# Patient Record
Sex: FEMALE | Race: WHITE | NOT HISPANIC OR LATINO | URBAN - NONMETROPOLITAN AREA
[De-identification: names, ages, dates, MRNs, and addresses within clinical notes are randomized per-mention and may not be internally consistent; named-entity substitution may affect disease eponyms.]

---

## 2018-06-26 ENCOUNTER — OFFICE VISIT (OUTPATIENT)
Dept: FAMILY MEDICINE CLINIC | Facility: CLINIC | Age: 53
End: 2018-06-26

## 2018-06-26 VITALS
DIASTOLIC BLOOD PRESSURE: 80 MMHG | WEIGHT: 147.9 LBS | SYSTOLIC BLOOD PRESSURE: 122 MMHG | HEIGHT: 64 IN | BODY MASS INDEX: 25.25 KG/M2

## 2018-06-26 DIAGNOSIS — R59.1 LYMPHADENOPATHY: ICD-10-CM

## 2018-06-26 DIAGNOSIS — Z72.0 TOBACCO USE: Primary | ICD-10-CM

## 2018-06-26 PROCEDURE — 99203 OFFICE O/P NEW LOW 30 MIN: CPT | Performed by: FAMILY MEDICINE

## 2018-06-26 PROCEDURE — 99406 BEHAV CHNG SMOKING 3-10 MIN: CPT | Performed by: FAMILY MEDICINE

## 2018-06-26 NOTE — PATIENT INSTRUCTIONS
Steps to Quit Smoking  Smoking tobacco can be harmful to your health and can affect almost every organ in your body. Smoking puts you, and those around you, at risk for developing many serious chronic diseases. Quitting smoking is difficult, but it is one of the best things that you can do for your health. It is never too late to quit.  What are the benefits of quitting smoking?  When you quit smoking, you lower your risk of developing serious diseases and conditions, such as:  · Lung cancer or lung disease, such as COPD.  · Heart disease.  · Stroke.  · Heart attack.  · Infertility.  · Osteoporosis and bone fractures.    Additionally, symptoms such as coughing, wheezing, and shortness of breath may get better when you quit. You may also find that you get sick less often because your body is stronger at fighting off colds and infections. If you are pregnant, quitting smoking can help to reduce your chances of having a baby of low birth weight.  How do I get ready to quit?  When you decide to quit smoking, create a plan to make sure that you are successful. Before you quit:  · Pick a date to quit. Set a date within the next two weeks to give you time to prepare.  · Write down the reasons why you are quitting. Keep this list in places where you will see it often, such as on your bathroom mirror or in your car or wallet.  · Identify the people, places, things, and activities that make you want to smoke (triggers) and avoid them. Make sure to take these actions:  ? Throw away all cigarettes at home, at work, and in your car.  ? Throw away smoking accessories, such as ashtrays and lighters.  ? Clean your car and make sure to empty the ashtray.  ? Clean your home, including curtains and carpets.  · Tell your family, friends, and coworkers that you are quitting. Support from your loved ones can make quitting easier.  · Talk with your health care provider about your options for quitting smoking.  · Find out what  treatment options are covered by your health insurance.    What strategies can I use to quit smoking?  Talk with your healthcare provider about different strategies to quit smoking. Some strategies include:  · Quitting smoking altogether instead of gradually lessening how much you smoke over a period of time. Research shows that quitting “cold turkey” is more successful than gradually quitting.  · Attending in-person counseling to help you build problem-solving skills. You are more likely to have success in quitting if you attend several counseling sessions. Even short sessions of 10 minutes can be effective.  · Finding resources and support systems that can help you to quit smoking and remain smoke-free after you quit. These resources are most helpful when you use them often. They can include:  ? Online chats with a counselor.  ? Telephone quitlines.  ? Printed self-help materials.  ? Support groups or group counseling.  ? Text messaging programs.  ? Mobile phone applications.  · Taking medicines to help you quit smoking. (If you are pregnant or breastfeeding, talk with your health care provider first.) Some medicines contain nicotine and some do not. Both types of medicines help with cravings, but the medicines that include nicotine help to relieve withdrawal symptoms. Your health care provider may recommend:  ? Nicotine patches, gum, or lozenges.  ? Nicotine inhalers or sprays.  ? Non-nicotine medicine that is taken by mouth.    Talk with your health care provider about combining strategies, such as taking medicines while you are also receiving in-person counseling. Using these two strategies together makes you more likely to succeed in quitting than if you used either strategy on its own.  If you are pregnant or breastfeeding, talk with your health care provider about finding counseling or other support strategies to quit smoking. Do not take medicine to help you quit smoking unless told to do so by your health  care provider.  What things can I do to make it easier to quit?  Quitting smoking might feel overwhelming at first, but there is a lot that you can do to make it easier. Take these important actions:  · Reach out to your family and friends and ask that they support and encourage you during this time. Call telephone quitlines, reach out to support groups, or work with a counselor for support.  · Ask people who smoke to avoid smoking around you.  · Avoid places that trigger you to smoke, such as bars, parties, or smoke-break areas at work.  · Spend time around people who do not smoke.  · Lessen stress in your life, because stress can be a smoking trigger for some people. To lessen stress, try:  ? Exercising regularly.  ? Deep-breathing exercises.  ? Yoga.  ? Meditating.  ? Performing a body scan. This involves closing your eyes, scanning your body from head to toe, and noticing which parts of your body are particularly tense. Purposefully relax the muscles in those areas.  · Download or purchase mobile phone or tablet apps (applications) that can help you stick to your quit plan by providing reminders, tips, and encouragement. There are many free apps, such as QuitGuide from the CDC (Centers for Disease Control and Prevention). You can find other support for quitting smoking (smoking cessation) through smokefree.gov and other websites.    How will I feel when I quit smoking?  Within the first 24 hours of quitting smoking, you may start to feel some withdrawal symptoms. These symptoms are usually most noticeable 2-3 days after quitting, but they usually do not last beyond 2-3 weeks. Changes or symptoms that you might experience include:  · Mood swings.  · Restlessness, anxiety, or irritation.  · Difficulty concentrating.  · Dizziness.  · Strong cravings for sugary foods in addition to nicotine.  · Mild weight gain.  · Constipation.  · Nausea.  · Coughing or a sore throat.  · Changes in how your medicines work in your  body.  · A depressed mood.  · Difficulty sleeping (insomnia).    After the first 2-3 weeks of quitting, you may start to notice more positive results, such as:  · Improved sense of smell and taste.  · Decreased coughing and sore throat.  · Slower heart rate.  · Lower blood pressure.  · Clearer skin.  · The ability to breathe more easily.  · Fewer sick days.    Quitting smoking is very challenging for most people. Do not get discouraged if you are not successful the first time. Some people need to make many attempts to quit before they achieve long-term success. Do your best to stick to your quit plan, and talk with your health care provider if you have any questions or concerns.  This information is not intended to replace advice given to you by your health care provider. Make sure you discuss any questions you have with your health care provider.  Document Released: 12/12/2002 Document Revised: 08/15/2017 Document Reviewed: 05/03/2016  [employer] Interactive Patient Education © 2018 [employer]

## 2018-06-26 NOTE — PROGRESS NOTES
Subjective   Norma Dukes is a 52 y.o. female.     History of Present [facility] center follow-up lymphadenopathy.  Develop painful lymph nodes left postauricular area past for 5 days.  No previous problem.  Has chronic bilateral ear pain.  Is a smoker.  Was placed on medication.  Symptoms have slightly abated.  Is noticed no skin lesions or other lesions.  I does visit with gynecology reviewed Cherie.  Is due for colonoscopy.  Past history is significant for cholecystectomy only.  Family history remarkable for father with oral cancer and hypertension.    The following portions of the patient's history were reviewed and updated as appropriate: allergies, current medications, past family history, past medical history, past social history, past surgical history and problem list.    Review of Systems   Constitutional: Negative for activity change, appetite change, fatigue and unexpected weight change.   HENT: Positive for ear pain. Negative for trouble swallowing and voice change.    Eyes: Negative for redness and visual disturbance.   Respiratory: Negative for cough and wheezing.    Cardiovascular: Negative for chest pain and palpitations.   Gastrointestinal: Negative for abdominal pain, constipation, diarrhea, nausea and vomiting.   Genitourinary: Negative for urgency.   Musculoskeletal: Negative for joint swelling.   Neurological: Negative for syncope and headaches.   Hematological: Negative for adenopathy.   Psychiatric/Behavioral: Negative for sleep disturbance.       Objective   Physical Exam   Constitutional: She is oriented to person, place, and time. She appears well-developed and well-nourished.   HENT:   Head: Normocephalic.   Right Ear: External ear normal.   Left Ear: External ear normal.   Nose: Nose normal.   Mouth/Throat: Oropharynx is clear and moist.   Eyes: Conjunctivae are normal. Pupils are equal, round, and reactive to light.   Neck: Normal range of motion. Neck supple. No JVD present. No  tracheal deviation present. No thyromegaly present.   Cardiovascular: Normal rate and regular rhythm.    Pulmonary/Chest: Effort normal and breath sounds normal. No stridor.   Abdominal: Soft. Bowel sounds are normal.   Musculoskeletal: Normal range of motion.   Lymphadenopathy:     She has no cervical adenopathy (Left posterior shows a 1/2-3.1 cm nontender lymph node on top of the mastoid process.  There is a 1/2-1 cm deep cervical lymph node left posterior chain freely mobile nontender no other lesions of the head and neck are seen.).   Neurological: She is alert and oriented to person, place, and time.   Skin: Skin is warm and dry. Capillary refill takes less than 2 seconds.   Psychiatric: She has a normal mood and affect. Her behavior is normal. Judgment and thought content normal.       Assessment/Plan   Norma was seen today for establish care.    Diagnoses and all orders for this visit:    Tobacco use  -     XR Chest 2 View    Lymphadenopathy  -     XR Chest 2 View      Patient does have a piercing in the cartilage of the left.  Is not inflamed but has been there for some time.  We have  removing same.  Counseled on increasing oral antibiotics.   no manipulation.  Reevaluation of the small areas of 3 weeks.  We will going get a chest x-ray given smoking history.   on stopping smoking.  3-10m

## 2018-07-17 ENCOUNTER — OFFICE VISIT (OUTPATIENT)
Dept: FAMILY MEDICINE CLINIC | Facility: CLINIC | Age: 53
End: 2018-07-17

## 2018-07-17 VITALS
DIASTOLIC BLOOD PRESSURE: 78 MMHG | WEIGHT: 148 LBS | HEIGHT: 64 IN | BODY MASS INDEX: 25.27 KG/M2 | SYSTOLIC BLOOD PRESSURE: 110 MMHG

## 2018-07-17 DIAGNOSIS — Z12.11 SCREEN FOR COLON CANCER: ICD-10-CM

## 2018-07-17 DIAGNOSIS — R59.1 LYMPHADENOPATHY OF HEAD AND NECK: Primary | ICD-10-CM

## 2018-07-17 DIAGNOSIS — Z72.0 TOBACCO USE: Chronic | ICD-10-CM

## 2018-07-17 PROCEDURE — 99213 OFFICE O/P EST LOW 20 MIN: CPT | Performed by: FAMILY MEDICINE

## 2018-07-17 NOTE — PROGRESS NOTES
Subjective   Norma Dukes is a 52 y.o. female.     History of Present Illness   reevaluation lymphadenopathy.  Patient states after taking hearing out of cartilage near lymph nodes went down to 0.  Feels well otherwise.  Counseled again on stopping smoking.  Declines vaccinations this time.  We'll make arrangements for colonoscopy.  Otherwise up-to-date.    The following portions of the patient's history were reviewed and updated as appropriate: allergies, current medications, past family history, past medical history, past social history, past surgical history and problem list.    Review of Systems   Constitutional: Negative for activity change, appetite change, fatigue and unexpected weight change.   HENT: Negative for trouble swallowing and voice change.    Eyes: Negative for redness and visual disturbance.   Respiratory: Negative for cough and wheezing.    Cardiovascular: Negative for chest pain and palpitations.   Gastrointestinal: Negative for abdominal pain, constipation, diarrhea, nausea and vomiting.   Genitourinary: Negative for urgency.   Musculoskeletal: Negative for joint swelling.   Neurological: Negative for syncope and headaches.   Hematological: Negative for adenopathy.   Psychiatric/Behavioral: Negative for sleep disturbance.       Objective   Physical Exam   Constitutional: She appears well-developed.   HENT:   Head: Normocephalic.   Right Ear: External ear normal.   Left Ear: External ear normal.   Nose: Nose normal.   Mouth/Throat: Oropharynx is clear and moist.   Eyes: Pupils are equal, round, and reactive to light.   Neck: Normal range of motion. Neck supple. No JVD present. No tracheal deviation present. No thyromegaly present.   Lymphadenopathy:        Head (right side): No preauricular, no posterior auricular and no occipital adenopathy present.        Head (left side): No preauricular, no posterior auricular and no occipital adenopathy present.   Psychiatric: She has a normal mood and  affect. Her speech is normal and behavior is normal.       Assessment/Plan   Norma was seen today for follow-up.    Diagnoses and all orders for this visit:    Lymphadenopathy of head and neck    Tobacco use    Screen for colon cancer  -     Ambulatory Referral For Screening Colonoscopy        on the above recheck as directed

## 2019-04-25 ENCOUNTER — OFFICE VISIT (OUTPATIENT)
Dept: OTOLARYNGOLOGY | Facility: CLINIC | Age: 54
End: 2019-04-25

## 2019-04-25 VITALS
HEIGHT: 64 IN | RESPIRATION RATE: 18 BRPM | SYSTOLIC BLOOD PRESSURE: 120 MMHG | WEIGHT: 160.4 LBS | BODY MASS INDEX: 27.39 KG/M2 | DIASTOLIC BLOOD PRESSURE: 84 MMHG

## 2019-04-25 DIAGNOSIS — H81.11 BPPV (BENIGN PAROXYSMAL POSITIONAL VERTIGO), RIGHT: Primary | ICD-10-CM

## 2019-04-25 PROCEDURE — 99203 OFFICE O/P NEW LOW 30 MIN: CPT | Performed by: OTOLARYNGOLOGY

## 2019-04-28 NOTE — PROGRESS NOTES
Subjective   Norma Dukes is a 53 y.o. female.     History of Present Illness   Patient states approximately 3 weeks ago she awoke with a sensation of the room spinning.  This was initially more frequent and long-lasting that it is now.  She has been given meclizine.  Symptoms would last up to 15 minutes.  Associated symptoms include nausea and ear pain.  No acute change in hearing.  Patient states she has chronic tinnitus that is not changed.  Symptoms are usually worse first thing in the morning.  No otorrhea.  Nothing in particular brought this on.  Last symptoms were yesterday.      The following portions of the patient's history were reviewed and updated as appropriate: allergies, current medications, past family history, past medical history, past social history, past surgical history and problem list.      Norma Dukes reports that she has been smoking.  She has a 30.00 pack-year smoking history. She has never used smokeless tobacco. She reports that she does not drink alcohol or use drugs.  Patient is a tobacco user and has been counseled for use of tobacco products    Family History   Problem Relation Age of Onset   • Hypertension Father    • Cancer Father         oral       No Known Allergies    No current outpatient medications on file.  (Patient was on meclizine but I recommended this be discontinued)  No past medical history on file.  Denies hypertension, coronary artery disease, diabetes    Review of Systems   Eyes: Positive for pain.   Gastrointestinal: Positive for nausea.   Neurological: Positive for dizziness and headaches.   All other systems reviewed and are negative.          Objective   Physical Exam  General: Well-developed well-nourished female in no acute distress.  Alert and oriented x-3. Head: Normocephalic. Face: Symmetrical strength and appearance. PERRL. EOMI. Voice:Strong. Speech:Fluent  Ears: External ears no deformity, canals no discharge, tympanic membranes intact clear  and mobile bilaterally.  Nose: Nares show no discharge mass polyp or purulence.  Boggy mucosa is present.  No gross external deformity.  Septum: To the left  Oral cavity: Lips and gums without lesions.  Tongue and floor of mouth without lesions.  Parotid and submandibular ducts unobstructed.  No mucosal lesions on the buccal mucosa or vestibule of the mouth.  Pharynx: No erythema exudate mass or ulcer  Neck: No lymphadenopathy.  No thyromegaly.  Trachea and larynx midline.  No masses in the parotid or submandibular glands.  Algona-Hallpike maneuvers produced brief rotatory nystagmus and subjective spinning vertigo with head to the right        Assessment/Plan   Norma was seen today for dizziness.    Diagnoses and all orders for this visit:    BPPV (benign paroxysmal positional vertigo), right      Plan: Explained the nature of BPPV to the patient.  Advised to discontinue the meclizine and perform Cawthorne exercises morning and evening.  Told her once she can no longer induce dizzines with the Cawthorne exercise that would mean that she could discontinue the exercises.  Return in 1 month if not resolved, call sooner for worsening.

## 2021-06-25 ENCOUNTER — OFFICE VISIT (OUTPATIENT)
Dept: ORTHOPEDIC SURGERY | Facility: CLINIC | Age: 56
End: 2021-06-25

## 2021-06-25 VITALS
WEIGHT: 150 LBS | BODY MASS INDEX: 25.61 KG/M2 | DIASTOLIC BLOOD PRESSURE: 82 MMHG | OXYGEN SATURATION: 99 % | RESPIRATION RATE: 18 BRPM | HEIGHT: 64 IN | SYSTOLIC BLOOD PRESSURE: 122 MMHG | HEART RATE: 119 BPM

## 2021-06-25 DIAGNOSIS — M79.641 RIGHT HAND PAIN: Primary | ICD-10-CM

## 2021-06-25 PROCEDURE — 99202 OFFICE O/P NEW SF 15 MIN: CPT | Performed by: ORTHOPAEDIC SURGERY

## 2021-06-25 NOTE — PROGRESS NOTES
Norma Dukes is a 55 y.o. female   Primary provider:  Provider, No Known       Chief Complaint   Patient presents with   • Right Hand - Pain, Initial Evaluation     X-rays [facility]  HISTORY OF PRESENT ILLNESS:right hand 5th finger pain.  Pain today 4/10    This is the first office visit for evaluation of right hand pain.    Mrs. Dukes is 55 years old and right-hand dominant.  She said she punched a wall on about 19 June with prompt onset of pain in the right hand.  She was seen at an urgent care facility and x-rays were performed.  She was given a splint.  She says her pain has improved.  The pain is primarily over the ulnar aspect of the hand.  This was an isolated injury.    She is taking no medications and has no allergies.  She smokes 1 pack/day of cigarettes.  Her general health is good.  She is  and does not work outside her home.    Pain  This is a new problem. The current episode started in the past 7 days (06/19/2021). The problem occurs intermittently. The problem has been unchanged. Associated symptoms comments: stabbing. The symptoms are aggravated by bending. She has tried acetaminophen for the symptoms. The treatment provided no relief.        CONCURRENT MEDICAL HISTORY:    History reviewed. No pertinent past medical history.    No Known Allergies    No current outpatient medications on file.    Past Surgical History:   Procedure Laterality Date   • CHOLECYSTECTOMY  2004       Family History   Problem Relation Age of Onset   • Hypertension Father    • Cancer Father         oral        Social History     Socioeconomic History   • Marital status:      Spouse name: Not on file   • Number of children: Not on file   • Years of education: Not on file   • Highest education level: Not on file   Tobacco Use   • Smoking status: Current Every Day Smoker     Packs/day: 1.00     Years: 30.00     Pack years: 30.00   • Smokeless tobacco: Never Used   Substance and Sexual Activity   • Alcohol use: No  "  • Drug use: No   • Sexual activity: Yes        Review of Systems   Musculoskeletal:        Right pinky finger pain   All other systems reviewed and are negative.    Review of systems is remarkable for pain in the right hand as noted above.  PHYSICAL EXAMINATION:       Vitals:    06/25/21 0821   BP: 122/82   Pulse: 119   Resp: 18   SpO2: 99%   Weight: 68 kg (150 lb)   Height: 162.6 cm (64\")   PainSc:   4       Physical Exam she is alert and in apparent mild discomfort.  She responds appropriately to questions and commands.    GAIT:     [x]  Normal  []  Antalgic    Assistive device: [x]  None  []  Walker     []  Crutches  []  Cane     []  Wheelchair  []  Stretcher    Ortho Exam is directed to the right upper extremity.  There is a short arm splint in place.  The splint was removed.  There is ecchymosis over the dorsal ulnar aspect of the hand.  There is no angular or rotational deformity of the fingers.  Sensory exam is intact to soft touch.  Radial pulse is strong.  There is tenderness diffusely over the small finger ray most prominent at the metacarpal neck level.    Radiographs of the right hand dated 20 June were reviewed.  There is a fracture of the small finger metacarpal neck with dorsal angulation estimated at 5 to 10 degrees and slight impaction.            ASSESSMENT: Fracture right small finger metacarpal neck.    I feel the current fracture position is acceptable.  Prognosis is excellent for healing with nonoperative care.    A new ulnar gutter splint was fabricated of fiberglass casting material.  She may remove this for gentle range of motion exercises and bathing..    Return here in 2 weeks for x-rays of the hand 3 views.        Diagnoses and all orders for this visit:    Right hand pain          PLAN    No follow-ups on file.    Srinath Billingsley MD  "

## 2021-07-02 DIAGNOSIS — M79.641 RIGHT HAND PAIN: Primary | ICD-10-CM

## 2021-07-08 ENCOUNTER — OFFICE VISIT (OUTPATIENT)
Dept: ORTHOPEDIC SURGERY | Facility: CLINIC | Age: 56
End: 2021-07-08

## 2021-07-08 VITALS
OXYGEN SATURATION: 97 % | SYSTOLIC BLOOD PRESSURE: 124 MMHG | DIASTOLIC BLOOD PRESSURE: 76 MMHG | BODY MASS INDEX: 25.74 KG/M2 | HEIGHT: 64 IN | WEIGHT: 150.8 LBS | HEART RATE: 100 BPM

## 2021-07-08 DIAGNOSIS — M79.641 RIGHT HAND PAIN: Primary | ICD-10-CM

## 2021-07-08 DIAGNOSIS — S62.326D CLOSED DISPLACED FRACTURE OF SHAFT OF FIFTH METACARPAL BONE OF RIGHT HAND WITH ROUTINE HEALING, SUBSEQUENT ENCOUNTER: ICD-10-CM

## 2021-07-08 PROCEDURE — 99212 OFFICE O/P EST SF 10 MIN: CPT | Performed by: ORTHOPAEDIC SURGERY

## 2021-07-08 NOTE — PROGRESS NOTES
"Norma Dukes is a 55 y.o. female returns for     Chief Complaint   Patient presents with   • Right Hand - Follow-up       HISTORY OF PRESENT ILLNESS: Patient being seen for right hand follow up. X-rays done today.     Mrs. Marcelo has had slow improvement in her pain.       CONCURRENT MEDICAL HISTORY:    The following portions of the patient's history were reviewed and updated as appropriate: allergies, current medications, past family history, past medical history, past social history, past surgical history and problem list.         PHYSICAL EXAMINATION:       /76 (BP Location: Left arm, Patient Position: Sitting, Cuff Size: Adult)   Pulse 100   Ht 162.6 cm (64\")   Wt 68.4 kg (150 lb 12.8 oz)   LMP  (LMP Unknown)   SpO2 97%   BMI 25.88 kg/m²     Physical Exam she is alert and in no apparent distress at rest.    GAIT:     [x]  Normal  []  Antalgic    Assistive device: [x]  None  []  Walker     []  Crutches  []  Cane     []  Wheelchair  []  Stretcher    Ortho Exam there is mild edema of the small finger.  Rotation of the finger is unremarkable.  She is quite apprehensive about flexion of the finger but has full active extension.  There is mild pain on stressing of the small finger metacarpal but no crepitus.      XR Hand 3+ View Right    Result Date: 7/8/2021  Narrative: Ordering Provider:  Srinath Billingsley MD Ordering Diagnosis/Indication:  Right hand pain Procedure:  XR HAND 3+ VW RIGHT Exam Date:  7/8/21 COMPARISON: Exam of the hand dated 21 June 202     Impression:  Radiographs of the right hand PA lateral and oblique views done today show a fracture of the small finger metacarpal neck.  There is minimal displacement similar to previous studies.  There is early callus. Srinath Billingsley MD 7/8/21    XR Hand 3+ View Right    Result Date: 6/20/2021  Narrative: PROCEDURE: XR HAND 3+ VW RIGHT VIEWS: 3 INDICATION: Pain COMPARISON: None FINDINGS:   - fracture: Transversely oriented, mildly " comminuted fracture of the neck of the fifth metacarpal   - alignment: Mild apex dorsal angulation at fracture site   - misc: Soft tissue swelling overlies the fracture     Impression: Mildly angulated, mildly comminuted fracture of the fifth metacarpal neck as above, with overlying soft tissue swelling. Note:  if pain or symptoms persist beyond reasonable expectations and follow-up imaging is anticipated,  cross sectional imaging  (CT and/or MRI) is suggested, as is deemed clinically appropriate. Electronically signed by:  Philly Melchor MD  6/20/2021 12:31 PM CDT Workstation: [id number]            ASSESSMENT: Healing metacarpal fracture.  She may wean herself from her splint.  She was encouraged in active active assisted and gentle passive motion of the finger.    Return here in 3 weeks if her symptoms have not improved.    Diagnoses and all orders for this visit:    Right hand pain    Closed displaced fracture of shaft of fifth metacarpal bone of right hand with routine healing, subsequent encounter          PLAN    No follow-ups on file.    Srinath Billingsley MD